# Patient Record
Sex: MALE | Race: WHITE | NOT HISPANIC OR LATINO | Employment: STUDENT | URBAN - METROPOLITAN AREA
[De-identification: names, ages, dates, MRNs, and addresses within clinical notes are randomized per-mention and may not be internally consistent; named-entity substitution may affect disease eponyms.]

---
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
## 2024-01-04 ENCOUNTER — OFFICE VISIT (OUTPATIENT)
Dept: DERMATOLOGY | Facility: CLINIC | Age: 19
End: 2024-01-04
Payer: COMMERCIAL

## 2024-01-04 DIAGNOSIS — L70.0 ACNE VULGARIS: Primary | ICD-10-CM

## 2024-01-04 PROCEDURE — 99214 OFFICE O/P EST MOD 30 MIN: CPT | Performed by: DERMATOLOGY

## 2024-01-04 RX ORDER — CLINDAMYCIN PHOSPHATE 10 UG/ML
LOTION TOPICAL EVERY MORNING
Qty: 60 ML | Refills: 11 | Status: SHIPPED | OUTPATIENT
Start: 2024-01-04 | End: 2025-01-03

## 2024-01-04 RX ORDER — BENZOYL PEROXIDE 50 MG/ML
LIQUID TOPICAL EVERY MORNING
Qty: 227 G | Refills: 11 | Status: SHIPPED | OUTPATIENT
Start: 2024-01-04 | End: 2025-01-03

## 2024-01-04 RX ORDER — DOXYCYCLINE HYCLATE 100 MG
100 TABLET ORAL 2 TIMES DAILY
Qty: 60 TABLET | Refills: 3 | Status: SHIPPED | OUTPATIENT
Start: 2024-01-04 | End: 2024-04-03 | Stop reason: ALTCHOICE

## 2024-01-04 RX ORDER — TRETINOIN 0.5 MG/G
CREAM TOPICAL NIGHTLY
Qty: 45 G | Refills: 3 | Status: SHIPPED | OUTPATIENT
Start: 2024-01-04 | End: 2025-01-03

## 2024-01-04 ASSESSMENT — DERMATOLOGY PATIENT ASSESSMENT
HAVE YOU HAD OR DO YOU HAVE VASCULAR DISEASE: NO
ARE YOU AN ORGAN TRANSPLANT RECIPIENT: NO
DO YOU USE SUNSCREEN: OCCASIONALLY
DO YOU HAVE ANY NEW OR CHANGING LESIONS: NO
HAVE YOU HAD OR DO YOU HAVE A STAPH INFECTION: NO
DO YOU USE A TANNING BED: NO

## 2024-01-04 ASSESSMENT — PATIENT GLOBAL ASSESSMENT (PGA): PATIENT GLOBAL ASSESSMENT: PATIENT GLOBAL ASSESSMENT:  1 - CLEAR

## 2024-01-04 ASSESSMENT — ITCH NUMERIC RATING SCALE: HOW SEVERE IS YOUR ITCHING?: 0

## 2024-01-04 ASSESSMENT — DERMATOLOGY QUALITY OF LIFE (QOL) ASSESSMENT
RATE HOW BOTHERED YOU ARE BY SYMPTOMS OF YOUR SKIN PROBLEM (EG, ITCHING, STINGING BURNING, HURTING OR SKIN IRRITATION): 1
RATE HOW BOTHERED YOU ARE BY EFFECTS OF YOUR SKIN PROBLEMS ON YOUR ACTIVITIES (EG, GOING OUT, ACCOMPLISHING WHAT YOU WANT, WORK ACTIVITIES OR YOUR RELATIONSHIPS WITH OTHERS): 1
ARE THERE EXCLUSIONS OR EXCEPTIONS FOR THE QUALITY OF LIFE ASSESSMENT: NO
WHAT SINGLE SKIN CONDITION LISTED BELOW IS THE PATIENT ANSWERING THE QUALITY-OF-LIFE ASSESSMENT QUESTIONS ABOUT: ACNE
RATE HOW EMOTIONALLY BOTHERED YOU ARE BY YOUR SKIN PROBLEM (FOR EXAMPLE, WORRY, EMBARRASSMENT, FRUSTRATION): 1
DATE THE QUALITY-OF-LIFE ASSESSMENT WAS COMPLETED: 66843

## 2024-01-04 NOTE — PATIENT INSTRUCTIONS
YOUR TOPICAL ACNE TREATMENT PLAN    Morning:    Wash face with:    Benzoyl peroxide  _4-5___% (Examples: PanOxyl, Clean&Clear) - this can bleach! Use an old towel or a white towel to dry off carefully             2. Apply medication(s):  Apply ___Clindamycin lotion__________________to affected areas of acne on face and/or body - spot treatment for active/new acne bumps         3. Moisturize:  If dry, apply non-scented, non-comedogenic moisturizer of your choice to affected areas. Picking a moisturizer with SPF 30+ to use in the morning will help dark marks fade faster. Examples: Neutrogena, Cetaphil, CeraVe, EltaMD (online)        Evening:    Wash face with:    Gentle, non-medicated wash (Examples: Cetaphil, CeraVe, Neutrogena UltraGentle)         2. Apply medication(s):  Apply ____tretinoin_________________to affected areas of acne on face (see below for detailed instructions)    When applying topical medications to the face, use the “5-dot” method. Take a small pea-sized amount and place dots in each of 5 locations of your face: mid-forehead, each cheek, nose, and chin. Then rub in. You should not see a “film” of the medication on your skin; if you do, you're probably using too much.    Topical medications may lead to dryness where you use them. This almost always improves as your skin gets used to the medication (about 2-3 weeks). Some tips to get you through this time include waiting 15-20 minutes after washing before applying the topical medication and starting out with use every 2-3 days, gradually working up to “every day” use.       3. Moisturize:  If dry, apply non-scented, non-comedogenic moisturizer of your choice to affected areas. Examples: Neutrogena, Cetaphil, CeraVe, EltaMD (online)          Body  Use benzoyl peroxide wash in the shower in the morning and apply clindamycin lotion to acne bumps    In the evening, apply 3 pea-sized amounts to whole back (one to the back of each shoulder and another on  low mid back)

## 2024-01-04 NOTE — PROGRESS NOTES
Subjective     Rey Haynes is a 18 y.o. male who presents for the following: Acne.     Last derm visit 8/2021, treatment doxycycline PO, benzoyl peroxide wash, clindamycin lotion, epiduo forte. Plan was to Return to clinic in 2 months and consider isotretinoin but did not follow up    Review of Systems:  No other skin or systemic complaints other than what is documented elsewhere in the note.    The following portions of the chart were reviewed this encounter and updated as appropriate:   Tobacco  Allergies  Meds  Problems  Med Hx  Surg Hx         Skin Cancer History  No skin cancer on file.      Specialty Problems    None       Objective   Well appearing patient in no apparent distress; mood and affect are within normal limits.    A focused skin examination was performed. All findings within normal limits unless otherwise noted below.    Assessment/Plan   1. Acne vulgaris  Head - Anterior (Face), Left Lower Back, Left Upper Back, Mid Back, Right Lower Back, Right Upper Back  Mostly inflammatory, most superficial but a few deep    -Discussed diagnosis of acne  - he has been off all therapy; cannot remember how well it really helped/controlled his acne.   - re-start Rx as below    -Discussed natural history of condition and expectations for treatment  -Recommend:    Related Procedures  Follow Up In Dermatology - Established Patient    Related Medications  benzoyl peroxide 5 % external wash  Apply topically once daily in the morning. May bleach fabrics, use an old or white towel    clindamycin (Cleocin T) 1 % lotion  Apply topically once daily in the morning. To new acne bumps    tretinoin (Retin-A) 0.05 % cream  Apply topically once daily at bedtime. A pea-sized amount to cover the whole face; start every 2-3 nights and gradually increase to nightly    doxycycline (Vibra-Tabs) 100 mg tablet  Take 1 tablet (100 mg) by mouth 2 times a day. Take with food and a full glass of water and do not lie down for at least 30  minutes after.        Follow up 3 months acne virtual visit

## 2024-02-01 ENCOUNTER — TELEPHONE (OUTPATIENT)
Dept: PEDIATRICS | Facility: CLINIC | Age: 19
End: 2024-02-01
Payer: COMMERCIAL

## 2024-02-01 DIAGNOSIS — Z13.0 SCREENING FOR SICKLE-CELL DISEASE OR TRAIT: Primary | ICD-10-CM

## 2024-02-01 NOTE — TELEPHONE ENCOUNTER
Can you please put in a requisition for sickle cell trait. Rey needs this to participate in sports. Thanks    [phone]    [email or web address]

## 2024-04-03 ENCOUNTER — TELEMEDICINE (OUTPATIENT)
Dept: DERMATOLOGY | Facility: CLINIC | Age: 19
End: 2024-04-03
Payer: COMMERCIAL

## 2024-04-03 DIAGNOSIS — L70.0 ACNE VULGARIS: ICD-10-CM

## 2024-04-03 PROCEDURE — 99214 OFFICE O/P EST MOD 30 MIN: CPT | Performed by: DERMATOLOGY

## 2024-04-03 RX ORDER — TRETINOIN 1 MG/G
CREAM TOPICAL NIGHTLY
Qty: 45 G | Refills: 3 | Status: SHIPPED | OUTPATIENT
Start: 2024-04-03 | End: 2025-04-03

## 2024-04-03 ASSESSMENT — DERMATOLOGY QUALITY OF LIFE (QOL) ASSESSMENT
DATE THE QUALITY-OF-LIFE ASSESSMENT WAS COMPLETED: 66933
RATE HOW EMOTIONALLY BOTHERED YOU ARE BY YOUR SKIN PROBLEM (FOR EXAMPLE, WORRY, EMBARRASSMENT, FRUSTRATION): 2
RATE HOW BOTHERED YOU ARE BY SYMPTOMS OF YOUR SKIN PROBLEM (EG, ITCHING, STINGING BURNING, HURTING OR SKIN IRRITATION): 0 - NEVER BOTHERED
RATE HOW BOTHERED YOU ARE BY SYMPTOMS OF YOUR SKIN PROBLEM (EG, ITCHING, STINGING BURNING, HURTING OR SKIN IRRITATION): 0 - NEVER BOTHERED
WHAT SINGLE SKIN CONDITION LISTED BELOW IS THE PATIENT ANSWERING THE QUALITY-OF-LIFE ASSESSMENT QUESTIONS ABOUT: ACNE
WHAT SINGLE SKIN CONDITION LISTED BELOW IS THE PATIENT ANSWERING THE QUALITY-OF-LIFE ASSESSMENT QUESTIONS ABOUT: ACNE
RATE HOW BOTHERED YOU ARE BY EFFECTS OF YOUR SKIN PROBLEMS ON YOUR ACTIVITIES (EG, GOING OUT, ACCOMPLISHING WHAT YOU WANT, WORK ACTIVITIES OR YOUR RELATIONSHIPS WITH OTHERS): 0 - NEVER BOTHERED
RATE HOW EMOTIONALLY BOTHERED YOU ARE BY YOUR SKIN PROBLEM (FOR EXAMPLE, WORRY, EMBARRASSMENT, FRUSTRATION): 2
RATE HOW BOTHERED YOU ARE BY EFFECTS OF YOUR SKIN PROBLEMS ON YOUR ACTIVITIES (EG, GOING OUT, ACCOMPLISHING WHAT YOU WANT, WORK ACTIVITIES OR YOUR RELATIONSHIPS WITH OTHERS): 0 - NEVER BOTHERED

## 2024-04-03 ASSESSMENT — PATIENT GLOBAL ASSESSMENT (PGA): WHAT IS THE PGA: PATIENT GLOBAL ASSESSMENT:  1 - CLEAR

## 2024-04-03 NOTE — PROGRESS NOTES
"Subjective     Rey Haynes is a 18 y.o. male who presents for the following: Acne.     Still getting a new acne bump every week but not every day    Review of Systems:  No other skin or systemic complaints other than what is documented elsewhere in the note.    The following portions of the chart were reviewed this encounter and updated as appropriate:   Tobacco  Allergies  Meds  Problems  Med Hx  Surg Hx  Fam Hx         Skin Cancer History  No skin cancer on file.      Specialty Problems    None       Objective   Well appearing patient in no apparent distress; mood and affect are within normal limits.    A focused skin examination was performed. All findings within normal limits unless otherwise noted below.    Assessment/Plan   1. Acne vulgaris  Head - Anterior (Face)  One resolving deeper papule on right cheek. 4-5 smaller pink resolving superifical papules on right forehead    -He took doxycycline for 1month but had to stop due to GI upset  - stopped benzoyl peroxide wash after \"recall\" with concern for benzene  - skin is not drying    - increase strength of tretinoin  - continue clindamycin lotion for spot treatment  - stop benzoyl peroxide wash for now until more information about benzene risk, just use cetaphil wash    -Recommend:    tretinoin (Retin-A) 0.1 % cream - Head - Anterior (Face)  Apply topically once daily at bedtime. A pea-sized amount to cover whole face; start every 2-3 nights and gradually increase to nightly    Related Procedures  Follow Up In Dermatology - Established Patient  Follow Up In Dermatology - Established Patient    Related Medications  benzoyl peroxide 5 % external wash  Apply topically once daily in the morning. May bleach fabrics, use an old or white towel    clindamycin (Cleocin T) 1 % lotion  Apply topically once daily in the morning. To new acne bumps    tretinoin (Retin-A) 0.05 % cream  Apply topically once daily at bedtime. A pea-sized amount to cover the whole face; " start every 2-3 nights and gradually increase to nightly        Follow up 3 months acne virtual visit if not controlled  Refills ok x1 year

## 2024-07-17 ENCOUNTER — APPOINTMENT (OUTPATIENT)
Dept: DERMATOLOGY | Facility: CLINIC | Age: 19
End: 2024-07-17
Payer: COMMERCIAL

## 2024-07-17 DIAGNOSIS — L70.0 ACNE VULGARIS: Primary | ICD-10-CM

## 2024-07-17 DIAGNOSIS — L81.0 POSTINFLAMMATORY HYPERPIGMENTATION: ICD-10-CM

## 2024-07-17 PROCEDURE — 99214 OFFICE O/P EST MOD 30 MIN: CPT | Performed by: DERMATOLOGY

## 2024-07-17 RX ORDER — SULFACETAMIDE SODIUM, SULFUR 100; 50 MG/G; MG/G
EMULSION TOPICAL DAILY
Qty: 227 G | Refills: 11 | Status: SHIPPED | OUTPATIENT
Start: 2024-07-17

## 2024-07-17 NOTE — PROGRESS NOTES
"Subjective     Rey Haynes is a 18 y.o. male who presents for the following: Acne. Patient was last seen 4/3/24. At that visit, tretinoin was increased to 0.1% cream and patient was continued on clindamycin 1% lotion. Patient reports slight improvement in his acne since starting the higher strength of tretinoin. He is currently using it every other night due to dryness/irritation.     Previous treatments:   -Benzoyl peroxide - stopped due to \"recall\"/concern for benzene.   -Doxycycline - GI upset    Review of Systems:  No other skin or systemic complaints other than what is documented elsewhere in the note.    The following portions of the chart were reviewed this encounter and updated as appropriate:   Tobacco  Allergies  Meds  Problems  Med Hx  Surg Hx  Fam Hx         Skin Cancer History  No skin cancer on file.      Specialty Problems          Dermatology Problems    Acne vulgaris        Objective   Well appearing patient in no apparent distress; mood and affect are within normal limits.    A focused skin examination was performed. All findings within normal limits unless otherwise noted below.    Assessment/Plan   1. Acne vulgaris  Head - Anterior (Face)  Few scattered pink superifical papules on forehead and right cheek.     Acne vulgaris, mild inflammatory  -Discussed multifactoral nature of this condition.   -Previously took doxycycline for 1month but had to stop due to GI upset.   -Stopped benzoyl peroxide wash after \"recall\" with concern for benzene.       -Continue tretinoin 0.1% cream nightly. Apply a pea-sized amount to the whole face every evening, decrease frequency as needed if too drying/irritating.   -Continue clindamycin 1% lotion to apply to affected areas every morning.  -Start sulfacetamide sodium-sulfur 10-5% wash every morning.   -Recommend a gentle non-mediated face wash every evening. Examples are Cerave, Cetaphil, Neutrogena UltraGentle.  -Risks, benefits, alternatives, and side " effects of the above medications discussed with the patient.     Related Procedures  Follow Up In Dermatology - Established Patient  Follow Up In Dermatology - Established Patient    Related Medications  clindamycin (Cleocin T) 1 % lotion  Apply topically once daily in the morning. To new acne bumps    tretinoin (Retin-A) 0.1 % cream  Apply topically once daily at bedtime. A pea-sized amount to cover whole face; start every 2-3 nights and gradually increase to nightly    sulfacetamide sodium-sulfur (Avar, Plexion) 10-5 % (w/w) topical emulsion  Apply topically once daily. As face wash    2. Postinflammatory hyperpigmentation  Head - Anterior (Face)  Scattered hyperpigmented papules on the face. Mostly on cheeks. A few atrophic papules, very thin, only about 2    -Discussed that lesions will continue to improve with time and use of tretinoin.       RTC in 3 months VV for acne.     Ana Rice,      I saw and evaluated the patient. I personally obtained the key and critical portions of the history and physical exam or was physically present for key and critical portions performed by the resident/fellow. I reviewed the resident/fellow's documentation and discussed the patient with the resident/fellow. I agree with the resident/fellow's medical decision making as documented in the note and made changes where appropriate.    Chely Ferrer MD

## 2024-09-10 ENCOUNTER — APPOINTMENT (OUTPATIENT)
Dept: DERMATOLOGY | Facility: CLINIC | Age: 19
End: 2024-09-10
Payer: COMMERCIAL

## 2024-10-16 ENCOUNTER — APPOINTMENT (OUTPATIENT)
Dept: DERMATOLOGY | Facility: CLINIC | Age: 19
End: 2024-10-16
Payer: COMMERCIAL

## 2024-11-06 ENCOUNTER — APPOINTMENT (OUTPATIENT)
Dept: DERMATOLOGY | Facility: CLINIC | Age: 19
End: 2024-11-06
Payer: COMMERCIAL

## 2024-11-06 DIAGNOSIS — L70.0 ACNE VULGARIS: Primary | ICD-10-CM

## 2024-11-06 PROCEDURE — 99213 OFFICE O/P EST LOW 20 MIN: CPT | Performed by: DERMATOLOGY

## 2024-11-06 RX ORDER — CLINDAMYCIN PHOSPHATE 10 UG/ML
LOTION TOPICAL EVERY MORNING
Qty: 60 ML | Refills: 3 | Status: SHIPPED | OUTPATIENT
Start: 2024-11-06 | End: 2025-11-06

## 2024-11-06 ASSESSMENT — DERMATOLOGY QUALITY OF LIFE (QOL) ASSESSMENT
RATE HOW BOTHERED YOU ARE BY SYMPTOMS OF YOUR SKIN PROBLEM (EG, ITCHING, STINGING BURNING, HURTING OR SKIN IRRITATION): 1
RATE HOW BOTHERED YOU ARE BY EFFECTS OF YOUR SKIN PROBLEMS ON YOUR ACTIVITIES (EG, GOING OUT, ACCOMPLISHING WHAT YOU WANT, WORK ACTIVITIES OR YOUR RELATIONSHIPS WITH OTHERS): 0 - NEVER BOTHERED
RATE HOW BOTHERED YOU ARE BY EFFECTS OF YOUR SKIN PROBLEMS ON YOUR ACTIVITIES (EG, GOING OUT, ACCOMPLISHING WHAT YOU WANT, WORK ACTIVITIES OR YOUR RELATIONSHIPS WITH OTHERS): 0 - NEVER BOTHERED
RATE HOW BOTHERED YOU ARE BY SYMPTOMS OF YOUR SKIN PROBLEM (EG, ITCHING, STINGING BURNING, HURTING OR SKIN IRRITATION): 1
WHAT SINGLE SKIN CONDITION LISTED BELOW IS THE PATIENT ANSWERING THE QUALITY-OF-LIFE ASSESSMENT QUESTIONS ABOUT: ACNE
WHAT SINGLE SKIN CONDITION LISTED BELOW IS THE PATIENT ANSWERING THE QUALITY-OF-LIFE ASSESSMENT QUESTIONS ABOUT: ACNE
RATE HOW EMOTIONALLY BOTHERED YOU ARE BY YOUR SKIN PROBLEM (FOR EXAMPLE, WORRY, EMBARRASSMENT, FRUSTRATION): 0 - NEVER BOTHERED
RATE HOW EMOTIONALLY BOTHERED YOU ARE BY YOUR SKIN PROBLEM (FOR EXAMPLE, WORRY, EMBARRASSMENT, FRUSTRATION): 0 - NEVER BOTHERED

## 2024-11-06 NOTE — PROGRESS NOTES
"Subjective     Rey Haynes is a 19 y.o. male who presents for the following: Acne. Last derm visit 7/17/24 for acne and post inflammatory hyperpigmentation.    Since that visit he feels like his acne is doing well. He uses the tretinoin at night. He used the sulfur wash but stopped due to smell    Review of Systems:  No other skin or systemic complaints other than what is documented elsewhere in the note.    The following portions of the chart were reviewed this encounter and updated as appropriate:   Tobacco  Allergies  Meds  Problems  Med Hx  Surg Hx  Fam Hx         Skin Cancer History  No skin cancer on file.      Specialty Problems          Dermatology Problems    Acne vulgaris        Objective   Well appearing patient in no apparent distress; mood and affect are within normal limits.    A focused skin examination was performed. All findings within normal limits unless otherwise noted below.    Assessment/Plan   1. Acne vulgaris  Head - Anterior (Face)  One fading papule on left cheek    Acne vulgaris, mild inflammatory today, overall controlled  -Previously took doxycycline for 1month but had to stop due to GI upset.   -Stopped benzoyl peroxide wash after \"recall\" with concern for benzene.   - stopped sulfur due to smell    -Continue tretinoin 0.1% cream nightly. Apply a pea-sized amount to the whole face every evening, decrease frequency as needed if too drying/irritating.   -Continue clindamycin 1% lotion to new acne bumps as they appear       Related Procedures  Follow Up In Dermatology - Established Patient    Related Medications  tretinoin (Retin-A) 0.1 % cream  Apply topically once daily at bedtime. A pea-sized amount to cover whole face; start every 2-3 nights and gradually increase to nightly    clindamycin (Cleocin T) 1 % lotion  Apply topically once daily in the morning. To new acne bumps        Follow up 6 months acne virtual visit   Refills ok for 1 year  "

## 2025-02-24 NOTE — PROGRESS NOTES
No chief complaint on file.      Consulting physician: Allen Vera MD    A report with my findings and recommendations will be sent to the primary and referring physician via written or electronic means when information is available    History of Present Illness:  Rey Haynes is a 19 y.o. male soccer with club team at [employer] and volleyball intramural with surgical history of left ACL reconstruction in 2020 with Ro who presented on 02/26/2025 with L knee pain.     On 2/20/25, made a lunging motion with left knee to intercept pass during soccer practice, felt valgus stress on his left knee and heard a few pops on the medial side of his knee. He never had any swelling. Pain on medial knee for one day. Has not returned to playing club soccer due to weakness. Denies locking, catching, giving out. He notes popping though he says this is not more than usual since his L ACLR. Did not need NSAIDs or tylenol. Did not need to ice.     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 To new acne bumps 60 mL 3    tretinoin (Retin-A) 0.1 % cream Apply topically once daily at bedtime. A pea-sized amount to cover whole face; start every 2-3 nights and gradually increase to nightly 45 g 3     No current facility-administered medications on file prior to visit.         Allergies:  No Known Allergies     Physical  Exam:    Visit Vitals  Smoking Status Unknown      General appearance: Well-appearing well-nourished  Psych: Normal mood and affect    Neuro: Normal sensation to light touch throughout the involved extremities  Vascular: No extremity edema or discoloration.  Skin: negative.  Lymphatic: no regional lymphadenopathy present.  Eyes: no conjunctival injection.    BILATERAL  Knee exam:     Inspection:  Effusion: None   Erythema No  Warmth No  Ecchymosis No  Quadriceps atrophy No    Knee ROM:    Flexion (140): Full, pain free  Extension (0): Full, pain free    Hip ROM:   Hip flexion (supine) (140) Full, pain free  Hip extension (prone) (15) Full, pain free  Hip abduction (45) Full, pain free  Hip adduction (30-45)Full, pain free  Hip IR at 90 flexion (40) Full, pain free  Hip ER at 90 Flexion(40-50) Full, pain free        Palpation:    TTP Medial joint line No R, + L medial femoral condyle insertion  TTP Lateral joint line No  TTP MCL No  TTP LCL No    TTP Inferior medial patellar facets No  TTP Superior medial patellar facets No  TTP Inferior lateral patellar facets No  TTP Superior lateral patellar facet No    TTP Medial femoral condyle No  TTP Lateral femoral condyle No  TTP Medial tibial plateau No  TTP Lateral tibial plateau No  TTP Tibial tubercle No  TTP Inferior pole patella No  TTP Fibular head No  TTP Hoffa's fat pad No    TTP Distal hamstring tendon No  TTP Pes anserine bursa No  TTP Quad tendon No  TTP Patellar tendon No  TTP Proximal gastrocnemius tendon No  TTP Distal iliotibial band, Gerdy's tubercle No    TTP Hip joint line No    Patellar testing:   quadrants of glide: normal  Pain w/ patellar compression No  Apprehension Negative  Inhibition Negative    Ligament testing:   Lachman Negative   Anterior drawer Negative   Valgus stress testing performed at 0 and 20 Negative  Varus stress testing performed at 0 and 20 Negative   Posterior drawer Negative   Dial test Negative     Meniscus tests:   Tobi's  Negative   Apley's Grind Negative     Strength:  Quadriceps pain free, 5/5  Hamstring pain free, 5/5  Hip abduction pain free, 5/5  Hip adduction pain free, 5/5  Hip flexion seated pain free, 5/5  Hip flexion supine pain free, 5/5  Hip extension pain free, 5/5    Flexibility:   Popliteal angle L 5  Popliteal angle R 0  Heel to butt: 5cm    Functional:  Trendelenburg: Negative   Single leg squats: valgus  Hop test: non painful  Squat and duck walk: no pain    Gait non-antalgic     Imaging:  L knee 2/26/25: post surg changes        Imaging was personally interpreted and reviewed with the patient and/or family    Impression and Plan:  Rey Haynes is a 19 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     Objective: no effusion, TTP prox MCL L only, full strength, all other testing normal and pain free incl valgus stress     Plan: has HEP from after ACL, can resume until pain free- he doesn't feel he needs PT at school.  If he chooses to ski w family this weekend, use the hinged brace he already has.  Activity as tolerated. Fu prn  400 mg ibuprofen, with food, 3 times per day as needed for pain        ** Please excuse any errors in grammar or translation related to this dictation. Voice recognition software was utilized to prepare this document. **

## 2025-02-26 ENCOUNTER — APPOINTMENT (OUTPATIENT)
Dept: ORTHOPEDIC SURGERY | Facility: CLINIC | Age: 20
End: 2025-02-26
Payer: COMMERCIAL

## 2025-02-26 ENCOUNTER — OFFICE VISIT (OUTPATIENT)
Dept: ORTHOPEDIC SURGERY | Facility: CLINIC | Age: 20
End: 2025-02-26
Payer: COMMERCIAL

## 2025-02-26 ENCOUNTER — HOSPITAL ENCOUNTER (OUTPATIENT)
Dept: RADIOLOGY | Facility: CLINIC | Age: 20
Discharge: HOME | End: 2025-02-26
Payer: COMMERCIAL

## 2025-02-26 DIAGNOSIS — M25.562 LEFT KNEE PAIN, UNSPECIFIED CHRONICITY: ICD-10-CM

## 2025-02-26 DIAGNOSIS — S83.412A SPRAIN OF MEDIAL COLLATERAL LIGAMENT OF LEFT KNEE, INITIAL ENCOUNTER: Primary | ICD-10-CM

## 2025-02-26 PROCEDURE — 99214 OFFICE O/P EST MOD 30 MIN: CPT | Performed by: PEDIATRICS

## 2025-02-26 PROCEDURE — 73564 X-RAY EXAM KNEE 4 OR MORE: CPT | Mod: LT

## 2025-02-26 NOTE — LETTER
February 26, 2025     Allen Vera MD  [address]  [address]  [facility] [address]    Patient: Rey Haynes   YOB: 2005   Date of Visit: 2/26/2025       Dear Dr. Allen Vera MD:    Thank you for referring Rey Haynes to me for evaluation. Below are my notes for this consultation.  If you have questions, please do not hesitate to call me. I look forward to following your patient along with you.       Sincerely,     Adelaida Tyler MD      CC: No Recipients  ______________________________________________________________________________________    No chief complaint on file.      Consulting physician: Allen Vera MD    A report with my findings and recommendations will be sent to the primary and referring physician via written or electronic means when information is available    History of Present Illness:  Rey Haynes is a 19 y.o. male soccer with club team [facility] [employer] and [employer] with surgical history of left ACL reconstruction in 2020 with Ro who presented on 02/26/2025 with L knee pain.     On 2/20/25, made a lunging motion with left knee to intercept pass during soccer practice, felt valgus stress on his left knee and heard a few pops on the medial side of his knee. He never had any swelling. Pain on medial knee for one day. Has not returned to playing club soccer due to weakness. Denies locking, catching, giving out. He notes popping though he says this is not more than usual since his L ACLR. Did not need NSAIDs or tylenol. Did not need to ice.     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 To new acne bumps 60 mL 3   • tretinoin (Retin-A) 0.1 % cream Apply topically once daily at bedtime. A pea-sized amount to cover whole face; start every 2-3 nights and gradually increase to nightly 45 g 3     No current facility-administered medications on file prior to visit.         Allergies:  No Known Allergies     Physical Exam:    Visit Vitals  Smoking Status Unknown      General appearance: Well-appearing well-nourished  Psych: Normal mood and affect    Neuro: Normal sensation to light touch throughout the involved extremities  Vascular: No extremity edema or discoloration.  Skin: negative.  Lymphatic: no regional lymphadenopathy present.  Eyes: no conjunctival injection.    BILATERAL  Knee exam:     Inspection:  Effusion: None   Erythema No  Warmth No  Ecchymosis No  Quadriceps atrophy No    Knee ROM:    Flexion (140): Full, pain free  Extension (0): Full, pain free    Hip ROM:   Hip flexion (supine) (140) Full, pain free  Hip extension (prone) (15) Full, pain free  Hip abduction (45) Full, pain free  Hip adduction (30-45)Full, pain free  Hip IR at 90 flexion (40) Full, pain free  Hip ER at 90 Flexion(40-50) Full, pain free        Palpation:    TTP Medial joint line No R, + L medial femoral condyle insertion  TTP Lateral joint line No  TTP MCL No  TTP LCL No    TTP Inferior medial patellar facets No  TTP Superior medial patellar facets No  TTP Inferior lateral patellar facets No  TTP Superior lateral patellar facet No    TTP Medial femoral condyle No  TTP Lateral femoral condyle No  TTP Medial tibial plateau No  TTP Lateral tibial plateau No  TTP Tibial tubercle No  TTP Inferior pole patella No  TTP Fibular head No  TTP Hoffa's fat pad No    TTP Distal hamstring  tendon No  TTP Pes anserine bursa No  TTP Quad tendon No  TTP Patellar tendon No  TTP Proximal gastrocnemius tendon No  TTP Distal iliotibial band, Gerdy's tubercle No    TTP Hip joint line No    Patellar testing:   quadrants of glide: normal  Pain w/ patellar compression No  Apprehension Negative  Inhibition Negative    Ligament testing:   Lachman Negative   Anterior drawer Negative   Valgus stress testing performed at 0 and 20 Negative  Varus stress testing performed at 0 and 20 Negative   Posterior drawer Negative   Dial test Negative     Meniscus tests:   Tobi's Negative   Apley's Grind Negative     Strength:  Quadriceps pain free, 5/5  Hamstring pain free, 5/5  Hip abduction pain free, 5/5  Hip adduction pain free, 5/5  Hip flexion seated pain free, 5/5  Hip flexion supine pain free, 5/5  Hip extension pain free, 5/5    Flexibility:   Popliteal angle L 5  Popliteal angle R 0  Heel to butt: 5cm    Functional:  Trendelenburg: Negative   Single leg squats: valgus  Hop test: non painful  Squat and duck walk: no pain    Gait non-antalgic     Imaging:  L knee 2/26/25: post surg changes        Imaging was personally interpreted and reviewed with the patient and/or family    Impression and Plan:  Rey Haynes is a 19 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     Objective: no effusion, TTP prox MCL L only, full strength, all other testing normal and pain free incl valgus stress     Plan: has HEP from after ACL, can resume until pain free- he doesn't feel he needs PT at school.  If he chooses to ski w family this weekend, use the hinged brace he already has.  Activity as tolerated. Fu prn  400 mg ibuprofen, with food, 3 times per day as needed for pain        ** Please excuse any errors in grammar or translation related to this dictation. Voice recognition software was  utilized to prepare this document. **

## 2025-02-26 NOTE — LETTER
February 26, 2025     Patient: Rey Haynes   YOB: 2005   Date of Visit: 2/26/2025       To Whom it May Concern:    Rey Haynes was seen in my clinic on 2/26/2025. He  may return to class on 2/27/25 .  Please excuse any time missed due to his appointment.     If you have any questions or concerns, please don't hesitate to call.         Sincerely,          Adelaida Tyler MD

## 2025-05-07 ENCOUNTER — APPOINTMENT (OUTPATIENT)
Dept: DERMATOLOGY | Facility: CLINIC | Age: 20
End: 2025-05-07
Payer: COMMERCIAL

## 2025-05-07 DIAGNOSIS — L70.0 ACNE VULGARIS: ICD-10-CM

## 2025-05-07 PROCEDURE — 99213 OFFICE O/P EST LOW 20 MIN: CPT | Performed by: DERMATOLOGY

## 2025-05-07 RX ORDER — CLINDAMYCIN PHOSPHATE 10 UG/ML
LOTION TOPICAL EVERY MORNING
Qty: 60 ML | Refills: 3 | Status: SHIPPED | OUTPATIENT
Start: 2025-05-07 | End: 2026-05-07

## 2025-05-07 RX ORDER — TRETINOIN 1 MG/G
CREAM TOPICAL NIGHTLY
Qty: 45 G | Refills: 3 | Status: SHIPPED | OUTPATIENT
Start: 2025-05-07 | End: 2026-05-07

## 2025-05-07 NOTE — Clinical Note
"Acne vulgaris, mild inflammatory today, overall controlled  - he is happy with this level of control    Past Rx:   - doxycycline for 1month but had to stop due to GI upset.  - Stopped benzoyl peroxide wash after \"recall\" with concern for benzene.   - stopped sulfur due to smell    Today:  - happy with current level of control  - still a little drying with tretinoin  - we will keep the same, but he can send MyChart in future to increase to tazarotene 0.05%  - refills were sent    "

## 2025-05-07 NOTE — PROGRESS NOTES
"Subjective     Rey Haynes is a 19 y.o. male who presents for the following: Acne. Last derm visit 11/6/24 for acne    Overall he says his acne is good. He is using clindamycin and tretinoin    Review of Systems:  No other skin or systemic complaints other than what is documented elsewhere in the note.    The following portions of the chart were reviewed this encounter and updated as appropriate:   Tobacco  Allergies  Meds  Problems  Med Hx  Surg Hx  Fam Hx         Skin Cancer History  Biopsy Log Book  No skin cancers from Specimen Tracking.    Additional History      Specialty Problems          Dermatology Problems    Acne vulgaris        Objective   Well appearing patient in no apparent distress; mood and affect are within normal limits.    A focused skin examination was performed. All findings within normal limits unless otherwise noted below.    Assessment/Plan   Skin Exam  1. ACNE VULGARIS  Head - Anterior (Face)  One fading papule on right cheek, small comedones on forehead, minimal  Acne vulgaris, mild inflammatory today, overall controlled  - he is happy with this level of control    Past Rx:   - doxycycline for 1month but had to stop due to GI upset.  - Stopped benzoyl peroxide wash after \"recall\" with concern for benzene.   - stopped sulfur due to smell    Today:  - happy with current level of control  - still a little drying with tretinoin  - we will keep the same, but he can send MyChart in future to increase to tazarotene 0.05%  - refills were sent    Related Procedures  Follow Up In Dermatology - Established Patient  Related Medications  tretinoin (Retin-A) 0.1 % cream  Apply topically once daily at bedtime. A pea-sized amount to cover whole face; start every 2-3 nights and gradually increase to nightly  clindamycin (Cleocin T) 1 % lotion  Apply topically once daily in the morning. To new acne bumps    Virtual or Telephone Consent    An interactive audio and video telecommunication system which " permits real time communications between the patient (at the originating site) and provider (at the distant site) was utilized to provide this telehealth service.   Verbal consent was requested and obtained from Rey Haynes on this date, 05/07/25 for a telehealth visit and the patient's location was confirmed at the time of the visit.

## 2025-08-13 PROCEDURE — 88342 IMHCHEM/IMCYTCHM 1ST ANTB: CPT | Performed by: STUDENT IN AN ORGANIZED HEALTH CARE EDUCATION/TRAINING PROGRAM

## 2025-08-13 PROCEDURE — 88305 TISSUE EXAM BY PATHOLOGIST: CPT

## 2025-08-13 PROCEDURE — 88342 IMHCHEM/IMCYTCHM 1ST ANTB: CPT

## 2025-08-13 PROCEDURE — 88305 TISSUE EXAM BY PATHOLOGIST: CPT | Performed by: STUDENT IN AN ORGANIZED HEALTH CARE EDUCATION/TRAINING PROGRAM

## 2025-08-14 ENCOUNTER — LAB REQUISITION (OUTPATIENT)
Dept: LAB | Facility: HOSPITAL | Age: 20
End: 2025-08-14
Payer: COMMERCIAL

## 2025-08-14 DIAGNOSIS — D16.5 BENIGN NEOPLASM OF LOWER JAW BONE: ICD-10-CM

## 2025-08-27 LAB
LAB AP ASR DISCLAIMER: NORMAL
LABORATORY COMMENT REPORT: NORMAL
PATH REPORT.FINAL DX SPEC: NORMAL
PATH REPORT.GROSS SPEC: NORMAL
PATH REPORT.RELEVANT HX SPEC: NORMAL
PATH REPORT.TOTAL CANCER: NORMAL